# Patient Record
Sex: FEMALE | Race: WHITE
[De-identification: names, ages, dates, MRNs, and addresses within clinical notes are randomized per-mention and may not be internally consistent; named-entity substitution may affect disease eponyms.]

---

## 2017-06-15 ENCOUNTER — HOSPITAL ENCOUNTER (OUTPATIENT)
Dept: HOSPITAL 33 - SDC | Age: 47
Discharge: HOME | End: 2017-06-15
Attending: SURGERY
Payer: COMMERCIAL

## 2017-06-15 VITALS — HEART RATE: 95 BPM | SYSTOLIC BLOOD PRESSURE: 135 MMHG | DIASTOLIC BLOOD PRESSURE: 97 MMHG

## 2017-06-15 VITALS — OXYGEN SATURATION: 99 %

## 2017-06-15 DIAGNOSIS — K42.9: ICD-10-CM

## 2017-06-15 DIAGNOSIS — K80.20: Primary | ICD-10-CM

## 2017-06-15 DIAGNOSIS — M06.9: ICD-10-CM

## 2017-06-15 PROCEDURE — 0FT44ZZ RESECTION OF GALLBLADDER, PERCUTANEOUS ENDOSCOPIC APPROACH: ICD-10-PCS | Performed by: SURGERY

## 2017-06-15 PROCEDURE — 00790 ANES IPER UPR ABD NOS: CPT

## 2017-06-15 PROCEDURE — 36415 COLL VENOUS BLD VENIPUNCTURE: CPT

## 2017-06-15 PROCEDURE — 84703 CHORIONIC GONADOTROPIN ASSAY: CPT

## 2017-06-15 PROCEDURE — 0WQF4ZZ REPAIR ABDOMINAL WALL, PERCUTANEOUS ENDOSCOPIC APPROACH: ICD-10-PCS | Performed by: SURGERY

## 2017-06-16 NOTE — OP
SURGERY DATE:    06/15/17



SURGERY TIME:    0952



PREOPERATIVE DIAGNOSIS: 

1.  SYMPTOMATIC CHOLELITHIASIS.



POSTOPERATIVE DIAGNOSIS:

1.  SYMPTOMATIC CHOLELITHIASIS.

2.  UMBILICAL HERNIA, MODERATE SIZED.



PROCEDURE:    

1.  Laparoscopic cholecystectomy.

2.  Umbilical herniorrhaphy.



SURGEON:        Wil Castro M.D.



ANESTHESIA:    General endotracheal tube.



COMPLICATIONS:    None.



CONDITION:        Stable.



INDICATION:    Patient presents for cholecystectomy.



OPERATIVE PROCEDURE:     Taken to surgery. General anesthetic. Routine prep and drape. 
There was an umbilical hernia in view, so 5 port was placed. It was insufflated. Four 5's 
were used. Good visualization. Cystic duct defined. Cystic artery defined. Both structures 
triply Ligaclipped and transected. Clips totally cross wall approximated. Gallbladder 
rolled out of gallbladder fossa. Gallbladder delivered through the umbilical port. Field 
was clean and dry. There was a moderate sized umbilical hernia. It was closed with two 
0-Vicryl's with the suture passer and this was pulled up. CO2 was then exsufflated. Skin 
closed with 4-0 Vicryl and Steri-strips. Patient tolerated the procedure satisfactory.

## 2017-06-16 NOTE — HP
DATE:  06/15/17    



ADMISSION DIAGNOSIS:

1.  GALLSTONES, SYMPTOMATIC.



ANTICIPATED PROCEDURE:

1.  Cholecystectomy.



HISTORY OF PRESENT ILLNESS:  The patient has upper abdominal pain and US revealing stones.



PAST MEDICAL HISTORY:  

ALLERGIES:   NONSTEROIDAL ANTI-INFLAMMATORY DRUGS.

CURRENT MEDICATIONS:  Calcium, gabapentin, clonazepam, methotrexate.



SURGERIES:  Tubal ligation.



REVIEW OF SYSTEMS:  Rheumatoid arthritis.



SOCIAL HISTORY:  Negative.

FAMILY HISTORY:  Negative.



PHYSICAL EXAMINATION:  Vital signs normal.

CHEST:  Clear.

COR:  Regular.

ABDOMEN:  Satisfactory.



IMPRESSION:  

1.  SYMPTOMATIC CHOLELITHIASIS.



PLAN:  Laparoscopic cholecystectomy.

## 2018-07-09 NOTE — HP
DATE OF SURGERY:  07/12/2018



ADMISSION DIAGNOSIS:  Ventral hernia. 

 

ANTICIPATED PROCEDURE: Ventral herniorrhaphy with mesh. 



HISTORY OF PRESENT ILLNESS: The patient has a fairly large ventral hernia in abdomen. 
Presents for repair with mesh.  



PAST MEDICAL HISTORY: 

ALLERGIES: NSAIDS.

MEDICATIONS:  Folic acid, calcium, vitamin D, gabapentin, clonazepam, methotrexate. 



PAST SURGICAL HISTORY: Tubal. 



SOCIAL HISTORY: Negative.  

FAMILY HISTORY: Negative.



REVIEW OF SYSTEMS: Rheumatoid arthritis. 



PHYSICAL EXAMINATION:  

VITAL SIGNS: Normal.

CHEST:  Clear.

COR: Regular.

ABDOMEN: Ventral hernia. 



IMPRESSION: Ventral hernia.



PLAN: Repair.

## 2018-07-12 ENCOUNTER — HOSPITAL ENCOUNTER (OUTPATIENT)
Dept: HOSPITAL 33 - SDC | Age: 48
Discharge: HOME | End: 2018-07-12
Attending: SURGERY
Payer: COMMERCIAL

## 2018-07-12 VITALS — OXYGEN SATURATION: 95 % | HEART RATE: 104 BPM | SYSTOLIC BLOOD PRESSURE: 158 MMHG | DIASTOLIC BLOOD PRESSURE: 100 MMHG

## 2018-07-12 DIAGNOSIS — M06.9: ICD-10-CM

## 2018-07-12 DIAGNOSIS — Z79.899: ICD-10-CM

## 2018-07-12 DIAGNOSIS — K43.9: Primary | ICD-10-CM

## 2018-07-12 LAB
ANION GAP SERPL CALC-SCNC: 13.1 MEQ/L (ref 5–15)
BUN SERPL-MCNC: 10 MG/DL (ref 7–17)
CALCIUM SPEC-MCNC: 9.1 MG/DL (ref 8.4–10.2)
CHLORIDE SERPL-SCNC: 107 MMOL/L (ref 98–107)
CO2 SERPL-SCNC: 24 MMOL/L (ref 22–30)
CREAT SERPL-MCNC: 0.64 MG/DL (ref 0.52–1.04)
GLUCOSE SERPL-MCNC: 97 MG/DL (ref 74–106)
POTASSIUM SERPLBLD-SCNC: 4.1 MMOL/L (ref 3.5–5.1)
SODIUM SERPL-SCNC: 140 MMOL/L (ref 137–145)

## 2018-07-12 PROCEDURE — 80048 BASIC METABOLIC PNL TOTAL CA: CPT

## 2018-07-12 PROCEDURE — 36415 COLL VENOUS BLD VENIPUNCTURE: CPT

## 2018-07-12 PROCEDURE — 71045 X-RAY EXAM CHEST 1 VIEW: CPT

## 2018-07-12 PROCEDURE — 94250: CPT

## 2018-07-12 PROCEDURE — 88302 TISSUE EXAM BY PATHOLOGIST: CPT

## 2018-07-12 NOTE — XRAY
Indication: Low oxygenation.  Trouble breathing.



Comparison: August 2, 2016.



Portable chest full inflated again without focal infiltrate, consolidation, or

large effusion.  Heart is not enlarged.  Vascularity normal.  Bony thorax

intact.



Impression: Stable nonacute chest.

## 2018-07-13 NOTE — OP
SURGERY DATE/TIME:  07/12/2018  1425



PREOPERATIVE DIAGNOSIS:     Symptomatic ventral hernia. 



POSTOPERATIVE DIAGNOSIS:   Symptomatic ventral hernia. 



PROCEDURE:    Ventral herniorrhaphy with mesh. 



SURGEON:        Wil Castro M.D.



ANESTHESIA:    General.



COMPLICATIONS:    None.



CONDITION:        Stable.



INDICATION:  A patient with symptomatic ventral hernia.  



DESCRIPTION OF PROCEDURE:  Taken to surgery. General anesthetic. Routine prep and drape. 
Transverse incision. The defect was about 1 inch. The hernia was 4 x 5 inches. The sac 
totally defined. The sac totally removed. Contents were mainly omentum and one loop of 
small bowel. They were reduced. They were incarcerated. Bicomponent mesh small was placed 
and was pulled up with four quadrant sutures of 0 Prolene with four inner quadrant sutures 
of 0 Prolene. Good approximation, hemostasis and repair. Subcu closed with 3-0 Vicryl. 
Skin closed with 4-0 Vicryl and Steri-Strips applied. Sterile dressing applied. The 
patient tolerated the procedure satisfactorily.

## 2019-06-20 ENCOUNTER — HOSPITAL ENCOUNTER (OUTPATIENT)
Dept: HOSPITAL 33 - SDC | Age: 49
Discharge: HOME | End: 2019-06-20
Attending: SURGERY
Payer: COMMERCIAL

## 2019-06-20 VITALS — DIASTOLIC BLOOD PRESSURE: 89 MMHG | SYSTOLIC BLOOD PRESSURE: 150 MMHG | HEART RATE: 91 BPM

## 2019-06-20 VITALS — OXYGEN SATURATION: 97 %

## 2019-06-20 DIAGNOSIS — K43.6: Primary | ICD-10-CM

## 2019-06-20 PROCEDURE — 94640 AIRWAY INHALATION TREATMENT: CPT

## 2019-06-20 PROCEDURE — 49561: CPT

## 2019-06-20 PROCEDURE — 49568: CPT

## 2019-06-20 NOTE — HP
DATE OF SURGERY:  06/20/2019



ADMISSION DIAGNOSIS:  Ventral hernia.



ANTICIPATED PROCEDURE:  Ventral hernia repair with mesh.



HISTORY OF PRESENT ILLNESS: The patient has ventral hernia just a little bit above the 
umbilicus a little bit on the right. 

 

PAST MEDICAL HISTORY: 

ALLERGIES: PENICILLIN. AUGMENTIN. NSAID. VANCOMYCIN.

MEDICATIONS:  Leflunomide, vitamin D, Effexor, gabapentin.  



PAST SURGICAL HISTORY:  Cholecystectomy. Tubal ligation. 



SOCIAL HISTORY: Negative.  

FAMILY HISTORY: Negative.



REVIEW OF SYSTEMS:  Rheumatoid arthritis. 



PHYSICAL EXAMINATION:  

VITAL SIGNS: Normal.

CHEST:  Clear.

COR: Regular.



IMPRESSION:  Hernia just above the umbilicus to the right.  



PLAN:  Repair. Xerosis Aggressive Treatment: I recommended application of Cetaphil or CeraVe numerous times a day going to bed to all dry areas. I also prescribed a topical steroid for twice daily use.

## 2019-06-21 NOTE — OP
SURGERY DATE/TIME:   06/20/2019   1557     



PREOPERATIVE DIAGNOSIS:     Ventral hernia. 



POSTOPERATIVE DIAGNOSIS:  Ventral hernia. 



PROCEDURE:    Ventral herniorrhaphy with mesh. 



SURGEON:        Wil Castro M.D.



ANESTHESIA:    General.



COMPLICATIONS:    None.



CONDITION:        Stable.



INDICATION:  A patient with requiring herniorrhaphy. 



DESCRIPTION OF PROCEDURE:  Taken to surgery. General anesthetic. Routine prep and drape. 
Transverse incision made below and to the left of umbilicus. The hernia sac was 4 inches. 
The hernia defect was 2 cm. Bicomponent mesh was chosen. It had been incarcerated. It was 
totally reduced. Hernia sac was removed. The edges were free. The mesh was placed, propped 
up and secured with four quadrant sutures, four interquadrant sutures 0 Prolene. Subcu 3-0 
Vicryl. Skin closed with 4-0 Vicryl. Steri-Strips applied.  Sterile dressing applied. The 
patient tolerated the procedure satisfactorily.

## 2020-08-20 NOTE — HP
DATE OF SURGERY:    08/27/2020



HISTORY OF PRESENT ILLNESS:  The patient presents to the office with complaints of severe 
abdominal pain. She states that this has increased over the past three days. She did go to 
the emergency room for this and was sent home. She was told that she had some adhesions 
and a hernia. She denies any nausea, vomiting or trouble with bowels currently.   



PAST MEDICAL HISTORY:  Rheumatoid arthritis. Hypertension. Iron deficient anemia. 
Depression. 



PAST SURGICAL HISTORY:  Cholecystectomy. Tubal ligation. Abdominal hernia repair x3. 



ALLERGIES:  NSAIDS.  PENICILLIN. CECLOR. AUGMENTIN. VANCOMYCIN.



MEDICATIONS:  Methotrexate, gabapentin, losartan, vitamin D, calcium, Effexor. 



FAMILY HISTORY: Chronic obstructive pulmonary disease. Emphysema. Diabetes. 



SOCIAL HISTORY:  Smokes one pack of cigarettes a day, occasional alcohol. 



REVIEW OF SYSTEMS: CONSTITUTIONAL: Denies fever or chills. CHEST: Denies shortness of 
breath. CVS: Denies chest pain. ABDOMEN: Reports abdominal pain. Denies nausea, vomiting, 
diarrhea, constipation or rectal bleeding. : Denies dysuria or hematuria. 



PHYSICAL EXAMINATION:  

GENERAL:  No acute distress.  

CHEST: Nonlabored. No shortness of breath. 

CVS:  Regular rate and rhythm.

ABDOMEN: She has a moderate ventral hernia superior to her umbilicus that does reduce. It 
is tender. 

EXTREMITIES: No edema. 

NEUROLOGIC: Alert.

PSYCHIATRIC: Appropriate. 



IMPRESSION:  Ventral hernia superior to umbilicus, tender, reducible. She has had a recent 
CT scan that showed she did have some bowel stuck up there and some scar tissue. It is not 
strangulated or incarcerated at this time.  



PLAN: Laparoscopic adhesiolysis, possible laparotomy, possible hernia repair with Dr. Wil Castro. 



As dictated by Marti Aguilar NP.

## 2020-08-27 ENCOUNTER — HOSPITAL ENCOUNTER (OUTPATIENT)
Dept: HOSPITAL 33 - SDC | Age: 50
Discharge: HOME | End: 2020-08-27
Attending: SURGERY
Payer: COMMERCIAL

## 2020-08-27 VITALS — SYSTOLIC BLOOD PRESSURE: 139 MMHG | DIASTOLIC BLOOD PRESSURE: 79 MMHG | OXYGEN SATURATION: 93 % | HEART RATE: 111 BPM

## 2020-08-27 DIAGNOSIS — Z79.899: ICD-10-CM

## 2020-08-27 DIAGNOSIS — K43.9: Primary | ICD-10-CM

## 2020-08-28 NOTE — OP
SURGERY DATE/TIME:  08/27/2020  1420     



PREOPERATIVE DIAGNOSIS:     The patient has mid abdominal pain both a little below the 
umbilicus and above the umbilicus. She may have a hernia. She had a previous herniorrhaphy 
x1. She may have some diastasis. 

 

POSTOPERATIVE DIAGNOSIS:   The patient has mid abdominal pain both a little below the 
umbilicus and above the umbilicus. She may have a hernia. She had a previous herniorrhaphy 
x1. She may have some diastasis. 



PROCEDURE:    Ventral herniorrhaphy with mesh. 



SURGEON:        Wil Castro M.D.



ANESTHESIA:    General.



COMPLICATIONS:    None.



CONDITION:        Stable.



INDICATION:  A patient with symptomatic ventral hernia. 



DESCRIPTION OF PROCEDURE:  She is taken to surgery. General anesthetic. Routine prep and 
drape. Time out performed. Right upper quadrant insufflated readily. Veress needle changed 
to 5 port.  There were adhesions on the omentum and against the anterior abdominal wall. 
There was also hernia defects. Two ports placed down the right side. The omentum was 
mobilized. A complete omental lysis of the omentum against the anterior abdominal wall. On 
inspection, the small bowel assessed and totally normal. There were no adhesions of the 
small bowels at all. There were two large defects about 3 cm each. There was a previously 
cleared 3 cm defect with very minimal bulging of the mesh and there were two small 1 cm 
holes this marked in the anterior abdominal wall. A 6 x 4 inch mesh would overlap all of 
this by at least 2 cm this was chosen and it was placed. It was pulled up and actually the 
two larger defects were primarily closed with two sutures of 0 Prolene each this basically 
had the major defects closed. The mesh was centered. It was pulled up. It did overlap 
inferiorly 2 cm in cephalad and 2 cm from the nearest defect. It was tacked top and 
bottom. It was tacked on the far side. Two - 5 ports were placed on the right side and 
then the left side of the mesh was tacked. The abdominal pressure had been lowered down to 
12. The paramedian positions of the graft were then tacked. An excellent, smooth, well 
approximated mesh with good overlap was present. The hole closure device was used at the 
left middle 12 port that had been exchanged from a 5 up to a 12 to allow placement of the 
mesh. Satisfactory closure. Field was totally dry. CO2 was exsufflated and the ports 
withdrawn. Skin closed with 4-0 Vicryl and Steri-Strips. The patient tolerated the 
procedure satisfactorily.